# Patient Record
Sex: MALE
[De-identification: names, ages, dates, MRNs, and addresses within clinical notes are randomized per-mention and may not be internally consistent; named-entity substitution may affect disease eponyms.]

---

## 2020-02-06 NOTE — DIAGNOSTIC IMAGING REPORT
EXAMINATION:  CHEST 2 VIEWS    



INDICATION: Pre-operative



COMPARISON: None

     

FINDINGS:



LINES/TUBES:None



LUNGS:The lungs are well-inflated. No focal consolidation or pulmonary edema.



PLEURA:No pleural effusion or pneumothorax.



MEDIASTINUM:The cardiomediastinal silhouette appears normal in size and shape.



BONES/SOFT TISSUES:No acute osseous injury.



ABDOMEN:No free air under the diaphragm.





IMPRESSION: 

No focal pneumonia or pulmonary edema.



Signed by: Eriberto Jewell MD on 2/6/2020 3:01 PM

## 2020-02-10 ENCOUNTER — HOSPITAL ENCOUNTER (OUTPATIENT)
Dept: HOSPITAL 88 - OR | Age: 75
Discharge: HOME | End: 2020-02-10
Attending: SURGERY
Payer: MEDICARE

## 2020-02-10 VITALS — SYSTOLIC BLOOD PRESSURE: 116 MMHG | DIASTOLIC BLOOD PRESSURE: 68 MMHG

## 2020-02-10 DIAGNOSIS — Z94.0: ICD-10-CM

## 2020-02-10 DIAGNOSIS — Z91.048: ICD-10-CM

## 2020-02-10 DIAGNOSIS — I10: ICD-10-CM

## 2020-02-10 DIAGNOSIS — K43.6: Primary | ICD-10-CM

## 2020-02-10 DIAGNOSIS — Z88.8: ICD-10-CM

## 2020-02-10 PROCEDURE — 93005 ELECTROCARDIOGRAM TRACING: CPT

## 2020-02-10 PROCEDURE — 71046 X-RAY EXAM CHEST 2 VIEWS: CPT

## 2020-02-10 PROCEDURE — 49653: CPT

## 2020-02-10 NOTE — XMS REPORT
Patient Summary Document

                             Created on: 02/10/2020



ROSALBA COOPER

External Reference #: [id number]

: 1945

Sex: Male



Demographics







                          Address                   [address] [place] [address] 

[place]  [address]

 

                          Home Phone                [phone]

 

                          Preferred Language        Unknown

 

                          Marital Status            Unknown

 

                          Restoration Affiliation     Unknown

 

                          Race                      Unknown

 

                                        Additional Race(s) 

 

 

                          Ethnic Group              Unknown





Author







                          Author                    [facility]

 

                          [facility]

 

                          Address                   Unknown

 

                          Phone                     Unavailable







Care Team Providers







                    Care Team Member Name    Role                Phone

 

                    ANABEL TRINH    Unavailable         Unavailable

 

                    MACEYKRISHAN SUTTON    Unavailable         Unavailable







Problems

This patient has no known problems.



Allergies, Adverse Reactions, Alerts

This patient has no known allergies or adverse reactions.



Medications

This patient has no known medications.



Encounters







             Start Date/Time    End Date/Time    Encounter Type    Admission Type    Attending Clinicians

                    Care Facility       Care Department     Encounter ID

 

        2019 09:16:29            Outpatient                    [facility]    [id number]

 

        2019 06:45:00    2019 06:45:00    Outpatient                    [facility]    7511







Results







           Test Description    Test Time    Test Comments    Text Results    Atomic Results    Result

 Comments

 

                CHEST 2 VIEWS    2020 15:01:00                                                             

                                             [place] [address]  
   Patient Name: ROSALBA COOPER                                   MR #: 
[id number]                     : 1945                                  
Age/Sex: 74/M  Acct #: [id number]                              Req #: [id number]
[id number]  Adm Physician:                                                      
Ordered by: DEEPA TRINH MD                            Report #: [id number]
       Location: OR                                      Room/Bed:              
      
___________________________________________________________________________________________________
   Procedure: [id number] DX/CHEST 2 VIEWS  Exam Date: 20                   
        Exam Time: 1435                                              REPORT 
STATUS: Signed    EXAMINATION:  CHEST 2 VIEWS          INDICATION: Pre-operative
     COMPARISON: None           FINDINGS:      LINES/TUBES:None      LUNGS:The 
lungs are well-inflated. No focal consolidation or pulmonary edema.      
PLEURA:No pleural effusion or pneumothorax.      MEDIASTINUM:The 
cardiomediastinal silhouette appears normal in size and shape.      BONES/SOFT 
TISSUES:No acute osseous injury.      ABDOMEN:No free air under the diaphragm.  
      IMPRESSION:    No focal pneumonia or pulmonary edema.      Signed by: 
Genoveva Alvarado MD on 2020 3:01 PM        Dictated By: GENOVEVA ALVARADO MD  
Electronically Signed By: GENOVEVA ALAVRADO MD on 20 1501  Transcribed By: TRESA ELIAS on 20 1501       COPY TO:   DEEPA TRINH MD              

 

                TACROLIMUS LEVEL    2019 11:13:00                      

 

   

 

                TACROLIMUS BLOOD (BEAKER) (test code=657)    6.2 ng/mL       10.0-20.0        





CREATININE, RANDOM [id number] 09:49:00* 





                Test Item       Value           Reference Range    Comments

 

                CREATININE URINE (BEAKER) (test code=375)    98.2 mg/dL                       





Reference Range: No NormalsPROTEIN, RANDOM [id number] 09:49:00* 





                Test Item       Value           Reference Range    Comments

 

                PROTEIN, URINE (BEAKER) (test code=1569)    10 mg/dL        0-14             





URINALYSIS W/ REFLEX URINE [id number] 09:25:00* 





                Test Item       Value           Reference Range    Comments

 

                COLOR (BEAKER) (test code=470)    Yellow                           

 

                CLARITY (BEAKER) (test code=469)    Clear                            

 

                SPECIFIC GRAVITY UA (BEAKER) (test code=468)    1.015           1.001-1.035      

 

                PH UA (BEAKER) (test code=467)    6.0             5.0-8.0          

 

                PROTEIN UA (BEAKER) (test code=464)    Negative        Negative         

 

                GLUCOSE UA (BEAKER) (test code=365)    Negative        Negative         

 

                KETONES UA (BEAKER) (test code=371)    Negative        Negative         

 

                BILIRUBIN UA (BEAKER) (test code=462)    Negative        Negative         

 

                BLOOD UA (BEAKER) (test code=461)    Negative        Negative         

 

                NITRITE UA (BEAKER) (test code=465)    Negative        Negative         

 

                LEUKOCYTE ESTERASE UA (BEAKER) (test code=466)    Negative        Negative         

 

                UROBILINOGEN UA (BEAKER) (test code=463)    0.2 mg/dL       0.2-1.0          

 

                RBC UA (BEAKER) (test code=519)    1 /HPF                           

 

                WBC UA (BEAKER) (test code=520)    < /HPF                           

 

                SQUAMOUS EPITHELIAL (BEAKER) (test code=516)    < /HPF                           

 

                SOURCE(BEAKER) (test code=2795)                                     





COMPREHENSIVE METABOLIC [id number] 09:00:00* 





                Test Item       Value           Reference Range    Comments

 

                TOTAL PROTEIN (BEAKER) (test code=770)    6.8 gm/dL       6.0-8.3          

 

                ALBUMIN (BEAKER) (test code=1145)    4.2 g/dL        3.5-5.0          

 

                ALKALINE PHOSPHATASE (BEAKER) (test code=346)    87 U/L                     

 

                BILIRUBIN TOTAL (BEAKER) (test code=377)    0.3 mg/dL       0.2-1.2          

 

                SODIUM (BEAKER) (test code=381)    141 meq/L       136-145          

 

                POTASSIUM (BEAKER) (test code=379)    3.5 meq/L       3.5-5.1          

 

                CHLORIDE (BEAKER) (test code=382)    108 meq/L                  

 

                CO2 (BEAKER) (test code=355)    25 meq/L        22-29            

 

                BLOOD UREA NITROGEN (BEAKER) (test code=354)    18 mg/dL        7-21             

 

                CREATININE (BEAKER) (test code=358)    1.19 mg/dL      0.57-1.25        

 

                GLUCOSE RANDOM (BEAKER) (test code=652)    118 mg/dL                  

 

                CALCIUM (BEAKER) (test code=697)    8.9 mg/dL       8.4-10.2         

 

                AST (SGOT) (BEAKER) (test code=353)    19 U/L          5-34             

 

                ALT (SGPT) (BEAKER) (test code=347)    21 U/L          6-55             

 

                EGFR (BEAKER) (test code=1092)    60 mL/min/1.73 sq m                    ESTIMATED GFR IS NOT AS 

ACCURATE AS CREATININE CLEARANCE IN PREDICTING GLOMERULAR FILTRATION RATE. 
ESTIMATED GFR IS NOT APPLICABLE FOR DIALYSIS PATIENTS.





PTH, [id number] 08:54:00* 





                Test Item       Value           Reference Range    Comments

 

                PARATHYROID HORMONE INTACT (BEAKER) (test code=577)    95.2 pg/mL      8.5-72.5         





CBC W/PLT COUNT & AUTO [id number] 08:33:00* 





                Test Item       Value           Reference Range    Comments

 

                WHITE BLOOD CELL COUNT (BEAKER) (test code=775)    6.0 K/ L        3.5-10.5         

 

                RED BLOOD CELL COUNT (BEAKER) (test code=761)    4.36 M/ L       4.63-6.08        

 

                HEMOGLOBIN (BEAKER) (test code=410)    12.9 GM/DL      13.7-17.5        

 

                HEMATOCRIT (BEAKER) (test code=411)    40.1 %          40.1-51.0        

 

                MEAN CORPUSCULAR VOLUME (BEAKER) (test code=753)    92.0 fL         79.0-92.2        

 

                MEAN CORPUSCULAR HEMOGLOBIN (BEAKER) (test code=751)    29.6 pg         25.7-32.2        

 

                    MEAN CORPUSCULAR HEMOGLOBIN CONC (BEAKER) (test code=752)    32.2 GM/DL          32.3-36.5

                                         

 

                RED CELL DISTRIBUTION WIDTH (BEAKER) (test code=412)    14.6 %          11.6-14.4        

 

                PLATELET COUNT (BEAKER) (test code=756)    192 K/CU MM     150-450          

 

                MEAN PLATELET VOLUME (BEAKER) (test code=754)    10.1 fL         9.4-12.4         

 

                NUCLEATED RED BLOOD CELLS (BEAKER) (test code=413)    0 /100 WBC      0-0              

 

                NEUTROPHILS RELATIVE PERCENT (BEAKER) (test code=429)    63 %                             

 

                LYMPHOCYTES RELATIVE PERCENT (BEAKER) (test code=430)    23 %                             

 

                MONOCYTES RELATIVE PERCENT (BEAKER) (test code=431)    9 %                              

 

                EOSINOPHILS RELATIVE PERCENT (BEAKER) (test code=432)    4 %                              

 

                BASOPHILS RELATIVE PERCENT (BEAKER) (test code=437)    0 %                              

 

                NEUTROPHILS ABSOLUTE COUNT (BEAKER) (test code=670)    3.76 K/ L       1.78-5.38        

 

                LYMPHOCYTES ABSOLUTE COUNT (BEAKER) (test code=414)    1.38 K/ L       1.32-3.57        

 

                MONOCYTES ABSOLUTE COUNT (BEAKER) (test code=415)    0.55 K/ L       0.30-0.82        

 

                EOSINOPHILS ABSOLUTE COUNT (BEAKER) (test code=416)    0.24 K/ L       0.04-0.54        

 

                BASOPHILS ABSOLUTE COUNT (BEAKER) (test code=417)    0.02 K/ L       0.01-0.08        

 

                IMMATURE GRANULOCYTES-RELATIVE PERCENT (BEAKER) (test code=2801)    0 %             0-1              





TACROLIMUS LEVEL2019-06-10 13:25:00* 





                Test Item       Value           Reference Range    Comments

 

                TACROLIMUS BLOOD (BEAKER) (test code=657)    5.6 ng/mL       10.0-20.0        





URINALYSIS W/ REFLEX URINE CULTURE2019-06-10 11:33:00* 





                Test Item       Value           Reference Range    Comments

 

                COLOR (BEAKER) (test code=470)    Yellow                           

 

                CLARITY (BEAKER) (test code=469)    Clear                            

 

                SPECIFIC GRAVITY UA (BEAKER) (test code=468)    1.012           1.001-1.035      

 

                PH UA (BEAKER) (test code=467)    6.0             5.0-8.0          

 

                PROTEIN UA (BEAKER) (test code=464)    Negative        Negative         

 

                GLUCOSE UA (BEAKER) (test code=365)    Negative        Negative         

 

                KETONES UA (BEAKER) (test code=371)    Negative        Negative         

 

                BILIRUBIN UA (BEAKER) (test code=462)    Negative        Negative         

 

                BLOOD UA (BEAKER) (test code=461)    Negative        Negative         

 

                NITRITE UA (BEAKER) (test code=465)    Negative        Negative         

 

                LEUKOCYTE ESTERASE UA (BEAKER) (test code=466)    Negative        Negative         

 

                UROBILINOGEN UA (BEAKER) (test code=463)    0.2 mg/dL       0.2-1.0          

 

                RBC UA (BEAKER) (test code=519)    < /HPF                           

 

                WBC UA (BEAKER) (test code=520)    < /HPF                           

 

                SQUAMOUS EPITHELIAL (BEAKER) (test code=516)    < /HPF                           

 

                SOURCE(BEAKER) (test code=2795)                                     





CREATININE, RANDOM URINE2019-06-10 10:05:00* 





                Test Item       Value           Reference Range    Comments

 

                CREATININE URINE (BEAKER) (test code=375)    81.1 mg/dL                       





Reference Range: No NormalsPROTEIN, RANDOM URINE2019-06-10 10:05:00* 





                Test Item       Value           Reference Range    Comments

 

                PROTEIN, URINE (BEAKER) (test code=1569)    7 mg/dL         0-14             





COMPREHENSIVE METABOLIC PANEL2019-06-10 09:19:00* 





                Test Item       Value           Reference Range    Comments

 

                TOTAL PROTEIN (BEAKER) (test code=770)    7.2 gm/dL       6.0-8.3          

 

                ALBUMIN (BEAKER) (test code=1145)    4.4 g/dL        3.5-5.0          

 

                ALKALINE PHOSPHATASE (BEAKER) (test code=346)    71 U/L                     

 

                BILIRUBIN TOTAL (BEAKER) (test code=377)    0.6 mg/dL       0.2-1.2          

 

                SODIUM (BEAKER) (test code=381)    139 meq/L       136-145          

 

                POTASSIUM (BEAKER) (test code=379)    3.5 meq/L       3.5-5.1          

 

                CHLORIDE (BEAKER) (test code=382)    105 meq/L                  

 

                CO2 (BEAKER) (test code=355)    26 meq/L        22-29            

 

                BLOOD UREA NITROGEN (BEAKER) (test code=354)    18 mg/dL        7-21             

 

                CREATININE (BEAKER) (test code=358)    1.13 mg/dL      0.57-1.25        

 

                GLUCOSE RANDOM (BEAKER) (test code=652)    101 mg/dL                  

 

                CALCIUM (BEAKER) (test code=697)    9.1 mg/dL       8.4-10.2         

 

                AST (SGOT) (BEAKER) (test code=353)    16 U/L          5-34             

 

                ALT (SGPT) (BEAKER) (test code=347)    16 U/L          6-55             

 

                EGFR (BEAKER) (test code=1092)    64 mL/min/1.73 sq m                    ESTIMATED GFR IS NOT AS 

ACCURATE AS CREATININE CLEARANCE IN PREDICTING GLOMERULAR FILTRATION RATE. 
ESTIMATED GFR IS NOT APPLICABLE FOR DIALYSIS PATIENTS.





PTH, INTACT2019-06-10 09:04:00* 





                Test Item       Value           Reference Range    Comments

 

                PARATHYROID HORMONE INTACT (BEAKER) (test code=577)    82.0 pg/mL      8.5-72.5         





CBC W/PLT COUNT & AUTO DIFFERENTIAL2019-06-10 08:40:00* 





                Test Item       Value           Reference Range    Comments

 

                WHITE BLOOD CELL COUNT (BEAKER) (test code=775)    8.0 K/ L        3.5-10.5         

 

                RED BLOOD CELL COUNT (BEAKER) (test code=761)    4.48 M/ L       4.63-6.08        

 

                HEMOGLOBIN (BEAKER) (test code=410)    13.0 GM/DL      13.7-17.5        

 

                HEMATOCRIT (BEAKER) (test code=411)    41.7 %          40.1-51.0        

 

                MEAN CORPUSCULAR VOLUME (BEAKER) (test code=753)    93.1 fL         79.0-92.2        

 

                MEAN CORPUSCULAR HEMOGLOBIN (BEAKER) (test code=751)    29.0 pg         25.7-32.2        

 

                    MEAN CORPUSCULAR HEMOGLOBIN CONC (BEAKER) (test code=752)    31.2 GM/DL          32.3-36.5

                                         

 

                RED CELL DISTRIBUTION WIDTH (BEAKER) (test code=412)    14.6 %          11.6-14.4        

 

                PLATELET COUNT (BEAKER) (test code=756)    207 K/CU MM     150-450          

 

                MEAN PLATELET VOLUME (BEAKER) (test code=754)    10.0 fL         9.4-12.4         

 

                NUCLEATED RED BLOOD CELLS (BEAKER) (test code=413)    0 /100 WBC      0-0              

 

                NEUTROPHILS RELATIVE PERCENT (BEAKER) (test code=429)    65 %                             

 

                LYMPHOCYTES RELATIVE PERCENT (BEAKER) (test code=430)    21 %                             

 

                MONOCYTES RELATIVE PERCENT (BEAKER) (test code=431)    9 %                              

 

                EOSINOPHILS RELATIVE PERCENT (BEAKER) (test code=432)    5 %                              

 

                BASOPHILS RELATIVE PERCENT (BEAKER) (test code=437)    1 %                              

 

                NEUTROPHILS ABSOLUTE COUNT (BEAKER) (test code=670)    5.15 K/ L       1.78-5.38        

 

                LYMPHOCYTES ABSOLUTE COUNT (BEAKER) (test code=414)    1.64 K/ L       1.32-3.57        

 

                MONOCYTES ABSOLUTE COUNT (BEAKER) (test code=415)    0.74 K/ L       0.30-0.82        

 

                EOSINOPHILS ABSOLUTE COUNT (BEAKER) (test code=416)    0.38 K/ L       0.04-0.54        

 

                BASOPHILS ABSOLUTE COUNT (BEAKER) (test code=417)    0.04 K/ L       0.01-0.08        

 

                IMMATURE GRANULOCYTES-RELATIVE PERCENT (BEAKER) (test code=2801)    0 %             0-1              





TACROLIMUS [id number] 11:56:00* 





                Test Item       Value           Reference Range    Comments

 

                TACROLIMUS BLOOD (BEAKER) (test code=657)    7.0 ng/mL       10.0-20.0        





CREATININE, RANDOM [id number] 10:53:00* 





                Test Item       Value           Reference Range    Comments

 

                CREATININE URINE (BEAKER) (test code=375)    78.5 mg/dL                       





Reference Range: No NormalsPROTEIN, RANDOM [id number] 10:53:00* 





                Test Item       Value           Reference Range    Comments

 

                PROTEIN, URINE (BEAKER) (test code=1569)    10 mg/dL        0-14             





PTH, [id number] 10:27:00* 





                Test Item       Value           Reference Range    Comments

 

                PARATHYROID HORMONE INTACT (BEAKER) (test code=577)    76.9 pg/mL      8.5-72.5         





LIPID [id number] 10:20:00* 





                Test Item       Value           Reference Range    Comments

 

                TRIGLYCERIDES (BEAKER) (test code=540)    161 mg/dL                        

 

                CHOLESTEROL (BEAKER) (test code=631)    153 mg/dL                        

 

                HDL CHOLESTEROL (BEAKER) (test code=976)    43 mg/dL                         

 

                LDL CHOLESTEROL CALCULATED (BEAKER) (test code=633)    78 mg/dL                         





Triglyceride Reference Range:   Low Risk         <150   Borderline    150-199   
High Risk     200-499   Very High Risk  >=500Cholesterol Reference Range:   Low 
Risk         <200   Borderline    200-239    High Risk        >240HDL 
Cholesterol Reference Range:   Low Risk         >=60   High Risk         <40LDL 
Cholesterol Reference Range:   Optimal          <100   Near Optimal  100-129   
Borderline    130-159   High          160-189   Very High       >=190   
COMPREHENSIVE METABOLIC [id number] 10:20:00* 





                Test Item       Value           Reference Range    Comments

 

                TOTAL PROTEIN (BEAKER) (test code=770)    7.0 gm/dL       6.0-8.3          

 

                ALBUMIN (BEAKER) (test code=1145)    4.3 g/dL        3.5-5.0          

 

                ALKALINE PHOSPHATASE (BEAKER) (test code=346)    68 U/L                     

 

                BILIRUBIN TOTAL (BEAKER) (test code=377)    0.6 mg/dL       0.2-1.2          

 

                SODIUM (BEAKER) (test code=381)    140 meq/L       136-145          

 

                POTASSIUM (BEAKER) (test code=379)    3.6 meq/L       3.5-5.1          

 

                CHLORIDE (BEAKER) (test code=382)    107 meq/L                  

 

                CO2 (BEAKER) (test code=355)    25 meq/L        22-29            

 

                BLOOD UREA NITROGEN (BEAKER) (test code=354)    17 mg/dL        7-21             

 

                CREATININE (BEAKER) (test code=358)    1.17 mg/dL      0.57-1.25        

 

                GLUCOSE RANDOM (BEAKER) (test code=652)    109 mg/dL                  

 

                CALCIUM (BEAKER) (test code=697)    9.1 mg/dL       8.4-10.2         

 

                AST (SGOT) (BEAKER) (test code=353)    24 U/L          5-34             

 

                ALT (SGPT) (BEAKER) (test code=347)    18 U/L          6-55             

 

                EGFR (BEAKER) (test code=1092)    61 mL/min/1.73 sq m                    ESTIMATED GFR IS NOT AS 

ACCURATE AS CREATININE CLEARANCE IN PREDICTING GLOMERULAR FILTRATION RATE. 
ESTIMATED GFR IS NOT APPLICABLE FOR DIALYSIS PATIENTS.





CBC W/PLT COUNT & AUTO [id number] 09:54:00* 





                Test Item       Value           Reference Range    Comments

 

                WHITE BLOOD CELL COUNT (BEAKER) (test code=775)    5.8 K/ L        3.5-10.5         

 

                RED BLOOD CELL COUNT (BEAKER) (test code=761)    4.32 M/ L       4.63-6.08        

 

                HEMOGLOBIN (BEAKER) (test code=410)    12.7 GM/DL      13.7-17.5        

 

                HEMATOCRIT (BEAKER) (test code=411)    40.4 %          40.1-51.0        

 

                MEAN CORPUSCULAR VOLUME (BEAKER) (test code=753)    93.5 fL         79.0-92.2        

 

                MEAN CORPUSCULAR HEMOGLOBIN (BEAKER) (test code=751)    29.4 pg         25.7-32.2        

 

                    MEAN CORPUSCULAR HEMOGLOBIN CONC (BEAKER) (test code=752)    31.4 GM/DL          32.3-36.5

                                         

 

                RED CELL DISTRIBUTION WIDTH (BEAKER) (test code=412)    14.6 %          11.6-14.4        

 

                PLATELET COUNT (BEAKER) (test code=756)    188 K/CU MM     150-450          

 

                MEAN PLATELET VOLUME (BEAKER) (test code=754)    10.1 fL         9.4-12.4         

 

                NUCLEATED RED BLOOD CELLS (BEAKER) (test code=413)    0 /100 WBC      0-0              

 

                NEUTROPHILS RELATIVE PERCENT (BEAKER) (test code=429)    63 %                             

 

                LYMPHOCYTES RELATIVE PERCENT (BEAKER) (test code=430)    20 %                             

 

                MONOCYTES RELATIVE PERCENT (BEAKER) (test code=431)    10 %                             

 

                EOSINOPHILS RELATIVE PERCENT (BEAKER) (test code=432)    7 %                              

 

                BASOPHILS RELATIVE PERCENT (BEAKER) (test code=437)    1 %                              

 

                NEUTROPHILS ABSOLUTE COUNT (BEAKER) (test code=670)    3.67 K/ L       1.78-5.38        

 

                LYMPHOCYTES ABSOLUTE COUNT (BEAKER) (test code=414)    1.14 K/ L       1.32-3.57        

 

                MONOCYTES ABSOLUTE COUNT (BEAKER) (test code=415)    0.55 K/ L       0.30-0.82        

 

                EOSINOPHILS ABSOLUTE COUNT (BEAKER) (test code=416)    0.40 K/ L       0.04-0.54        

 

                BASOPHILS ABSOLUTE COUNT (BEAKER) (test code=417)    0.03 K/ L       0.01-0.08        

 

                IMMATURE GRANULOCYTES-RELATIVE PERCENT (BEAKER) (test code=2801)    0 %             0-1              





TACROLIMUS [id number] 11:44:00* 





                Test Item       Value           Reference Range    Comments

 

                TACROLIMUS BLOOD (BEAKER) (test code=657)    6.2 ng/mL       10.0-20.0        





PTH, [id number] 09:40:00* 





                Test Item       Value           Reference Range    Comments

 

                PARATHYROID HORMONE INTACT (BEAKER) (test code=577)    104.7 pg/mL     8.5-72.5         





CREATININE, RANDOM [id number] 09:40:00* 





                Test Item       Value           Reference Range    Comments

 

                CREATININE URINE (BEAKER) (test code=375)    96.6 mg/dL                       





Reference Range: No NormalsPROTEIN, RANDOM [id number] 09:40:00* 





                Test Item       Value           Reference Range    Comments

 

                PROTEIN, URINE (BEAKER) (test code=1569)    7 mg/dL         0-14             





COMPREHENSIVE METABOLIC [id number] 09:32:00* 





                Test Item       Value           Reference Range    Comments

 

                TOTAL PROTEIN (BEAKER) (test code=770)    6.8 gm/dL       6.0-8.3          

 

                ALBUMIN (BEAKER) (test code=1145)    4.3 g/dL        3.5-5.0          

 

                ALKALINE PHOSPHATASE (BEAKER) (test code=346)    63 U/L                     

 

                BILIRUBIN TOTAL (BEAKER) (test code=377)    0.6 mg/dL       0.2-1.2          

 

                SODIUM (BEAKER) (test code=381)    141 meq/L       136-145          

 

                POTASSIUM (BEAKER) (test code=379)    3.7 meq/L       3.5-5.1          

 

                CHLORIDE (BEAKER) (test code=382)    107 meq/L                  

 

                CO2 (BEAKER) (test code=355)    25 meq/L        22-29            

 

                BLOOD UREA NITROGEN (BEAKER) (test code=354)    17 mg/dL        7-21             

 

                CREATININE (BEAKER) (test code=358)    0.99 mg/dL      0.57-1.25        

 

                GLUCOSE RANDOM (BEAKER) (test code=652)    107 mg/dL                  

 

                CALCIUM (BEAKER) (test code=697)    9.1 mg/dL       8.4-10.2         

 

                AST (SGOT) (BEAKER) (test code=353)    20 U/L          5-34             

 

                ALT (SGPT) (BEAKER) (test code=347)    20 U/L          6-55             

 

                EGFR (BEAKER) (test code=1092)    74 mL/min/1.73 sq m                    ESTIMATED GFR IS NOT AS 

ACCURATE AS CREATININE CLEARANCE IN PREDICTING GLOMERULAR FILTRATION RATE. 
ESTIMATED GFR IS NOT APPLICABLE FOR DIALYSIS PATIENTS.





CBC W/PLT COUNT & AUTO [id number] 09:12:00* 





                Test Item       Value           Reference Range    Comments

 

                WHITE BLOOD CELL COUNT (BEAKER) (test code=775)    5.2 K/ L        3.5-10.5         

 

                RED BLOOD CELL COUNT (BEAKER) (test code=761)    4.42 M/ L       4.63-6.08        

 

                HEMOGLOBIN (BEAKER) (test code=410)    12.8 GM/DL      13.7-17.5        

 

                HEMATOCRIT (BEAKER) (test code=411)    40.7 %          40.1-51.0        

 

                MEAN CORPUSCULAR VOLUME (BEAKER) (test code=753)    92.1 fL         79.0-92.2        

 

                MEAN CORPUSCULAR HEMOGLOBIN (BEAKER) (test code=751)    29.0 pg         25.7-32.2        

 

                    MEAN CORPUSCULAR HEMOGLOBIN CONC (BEAKER) (test code=752)    31.4 GM/DL          32.3-36.5

                                         

 

                RED CELL DISTRIBUTION WIDTH (BEAKER) (test code=412)    14.6 %          11.6-14.4        

 

                PLATELET COUNT (BEAKER) (test code=756)    194 K/CU MM     150-450          

 

                MEAN PLATELET VOLUME (BEAKER) (test code=754)    10.3 fL         9.4-12.4         

 

                NUCLEATED RED BLOOD CELLS (BEAKER) (test code=413)    0 /100 WBC      0-0              

 

                NEUTROPHILS RELATIVE PERCENT (BEAKER) (test code=429)    59 %                             

 

                LYMPHOCYTES RELATIVE PERCENT (BEAKER) (test code=430)    25 %                             

 

                MONOCYTES RELATIVE PERCENT (BEAKER) (test code=431)    11 %                             

 

                EOSINOPHILS RELATIVE PERCENT (BEAKER) (test code=432)    5 %                              

 

                BASOPHILS RELATIVE PERCENT (BEAKER) (test code=437)    1 %                              

 

                NEUTROPHILS ABSOLUTE COUNT (BEAKER) (test code=670)    3.03 K/ L       1.78-5.38        

 

                LYMPHOCYTES ABSOLUTE COUNT (BEAKER) (test code=414)    1.28 K/ L       1.32-3.57        

 

                MONOCYTES ABSOLUTE COUNT (BEAKER) (test code=415)    0.54 K/ L       0.30-0.82        

 

                EOSINOPHILS ABSOLUTE COUNT (BEAKER) (test code=416)    0.26 K/ L       0.04-0.54        

 

                BASOPHILS ABSOLUTE COUNT (BEAKER) (test code=417)    0.03 K/ L       0.01-0.08        

 

                IMMATURE GRANULOCYTES-RELATIVE PERCENT (BEAKER) (test code=2801)    0 %             0-1              





URINALYSIS W/ REFLEX URINE [id number] 09:11:00* 





                Test Item       Value           Reference Range    Comments

 

                COLOR (BEAKER) (test code=470)    Yellow                           

 

                CLARITY (BEAKER) (test code=469)    Clear                            

 

                SPECIFIC GRAVITY UA (BEAKER) (test code=468)    1.013           1.001-1.035      

 

                PH UA (BEAKER) (test code=467)    6.0             5.0-8.0          

 

                PROTEIN UA (BEAKER) (test code=464)    Negative        Negative         

 

                GLUCOSE UA (BEAKER) (test code=365)    Negative        Negative         

 

                KETONES UA (BEAKER) (test code=371)    Negative        Negative         

 

                BILIRUBIN UA (BEAKER) (test code=462)    Negative        Negative         

 

                BLOOD UA (BEAKER) (test code=461)    Negative        Negative         

 

                NITRITE UA (BEAKER) (test code=465)    Negative        Negative         

 

                LEUKOCYTE ESTERASE UA (BEAKER) (test code=466)    Negative        Negative         

 

                UROBILINOGEN UA (BEAKER) (test code=463)    0.2 mg/dL       0.2-1.0          

 

                RBC UA (BEAKER) (test code=519)    1 /HPF                           

 

                WBC UA (BEAKER) (test code=520)    1 /HPF                           

 

                SOURCE(BEAKER) (test code=2795)                                     





TACROLIMUS LEVEL2017-11-15 12:43:00* 





                Test Item       Value           Reference Range    Comments

 

                TACROLIMUS BLOOD (BEAKER) (test code=657)    6.9 ng/mL       10.0-20.0        





URINALYSIS W/ REFLEX URINE CULTURE2017-11-15 11:29:00* 





                Test Item       Value           Reference Range    Comments

 

                COLOR (BEAKER) (test code=470)    Light Yellow                     

 

                CLARITY (BEAKER) (test code=469)    Clear                            

 

                SPECIFIC GRAVITY UA (BEAKER) (test code=468)    1.012           1.001-1.035      

 

                PH UA (BEAKER) (test code=467)    6.0             5.0-8.0          

 

                PROTEIN UA (BEAKER) (test code=464)    Negative        Negative         

 

                GLUCOSE UA (BEAKER) (test code=365)    Negative        Negative         

 

                KETONES UA (BEAKER) (test code=371)    Negative        Negative         

 

                BILIRUBIN UA (BEAKER) (test code=462)    Negative        Negative         

 

                BLOOD UA (BEAKER) (test code=461)    Negative        Negative         

 

                NITRITE UA (BEAKER) (test code=465)    Negative        Negative         

 

                LEUKOCYTE ESTERASE UA (BEAKER) (test code=466)    Negative        Negative         

 

                UROBILINOGEN UA (BEAKER) (test code=463)    0.2 mg/dL       0.2-1.0          

 

                RBC UA (BEAKER) (test code=519)    1 /HPF                           

 

                WBC UA (BEAKER) (test code=520)    < /HPF                           

 

                SOURCE(BEAKER) (test code=2795)                                     





CREATININE, RANDOM URINE2017-11-15 09:44:00* 





                Test Item       Value           Reference Range    Comments

 

                CREATININE URINE (BEAKER) (test code=375)    63.9 mg/dL                       





Reference Range: No NormalsPROTEIN, RANDOM URINE2017-11-15 09:44:00* 





                Test Item       Value           Reference Range    Comments

 

                PROTEIN, URINE (BEAKER) (test code=1569)    8 mg/dL         0-14             





COMPREHENSIVE METABOLIC PANEL2017-11-15 09:42:00* 





                Test Item       Value           Reference Range    Comments

 

                TOTAL PROTEIN (BEAKER) (test code=770)    7.1 gm/dL       6.0-8.3          

 

                ALBUMIN (BEAKER) (test code=1145)    4.2 g/dL        3.5-5.0          

 

                ALKALINE PHOSPHATASE (BEAKER) (test code=346)    65 U/L                     

 

                BILIRUBIN TOTAL (BEAKER) (test code=377)    0.5 mg/dL       0.2-1.2          

 

                SODIUM (BEAKER) (test code=381)    140 meq/L       136-145          

 

                POTASSIUM (BEAKER) (test code=379)    3.7 meq/L       3.5-5.1          

 

                CHLORIDE (BEAKER) (test code=382)    107 meq/L                  

 

                CO2 (BEAKER) (test code=355)    24 meq/L        22-29            

 

                BLOOD UREA NITROGEN (BEAKER) (test code=354)    21 mg/dL        7-21             

 

                CREATININE (BEAKER) (test code=358)    0.98 mg/dL      0.57-1.25        

 

                GLUCOSE RANDOM (BEAKER) (test code=652)    102 mg/dL                  

 

                CALCIUM (BEAKER) (test code=697)    9.1 mg/dL       8.4-10.2         

 

                AST (SGOT) (BEAKER) (test code=353)    22 U/L          5-34             

 

                ALT (SGPT) (BEAKER) (test code=347)    19 U/L          6-55             

 

                EGFR (BEAKER) (test code=1092)    75 mL/min/1.73 sq m                    ESTIMATED GFR IS NOT AS 

ACCURATE AS CREATININE CLEARANCE IN PREDICTING GLOMERULAR FILTRATION RATE. 
ESTIMATED GFR IS NOT APPLICABLE FOR DIALYSIS PATIENTS.





PTH, INTACT2017-11-15 09:40:00* 





                Test Item       Value           Reference Range    Comments

 

                PARATHYROID HORMONE INTACT (BEAKER) (test code=577)    74.8 pg/mL      8.5-72.5         





CBC W/PLT COUNT & AUTO DIFFERENTIAL2017-11-15 09:24:00* 





                Test Item       Value           Reference Range    Comments

 

                WHITE BLOOD CELL COUNT (BEAKER) (test code=775)    6.3 K/ L        3.5-10.5         

 

                RED BLOOD CELL COUNT (BEAKER) (test code=761)    4.45 M/ L       4.63-6.08        

 

                HEMOGLOBIN (BEAKER) (test code=410)    13.3 GM/DL      13.7-17.5        

 

                HEMATOCRIT (BEAKER) (test code=411)    41.3 %          40.1-51.0        

 

                MEAN CORPUSCULAR VOLUME (BEAKER) (test code=753)    92.8 fL         79.0-92.2        

 

                MEAN CORPUSCULAR HEMOGLOBIN (BEAKER) (test code=751)    29.9 pg         25.7-32.2        

 

                    MEAN CORPUSCULAR HEMOGLOBIN CONC (BEAKER) (test code=752)    32.2 GM/DL          32.3-36.5

                                         

 

                RED CELL DISTRIBUTION WIDTH (BEAKER) (test code=412)    14.4 %          11.6-14.4        

 

                PLATELET COUNT (BEAKER) (test code=756)    209 K/CU MM     150-450          

 

                MEAN PLATELET VOLUME (BEAKER) (test code=754)    10.2 fL         9.4-12.4         

 

                NUCLEATED RED BLOOD CELLS (BEAKER) (test code=413)    0 /100 WBC      0-0              

 

                NEUTROPHILS RELATIVE PERCENT (BEAKER) (test code=429)    64 %                             

 

                LYMPHOCYTES RELATIVE PERCENT (BEAKER) (test code=430)    21 %                             

 

                MONOCYTES RELATIVE PERCENT (BEAKER) (test code=431)    9 %                              

 

                EOSINOPHILS RELATIVE PERCENT (BEAKER) (test code=432)    5 %                              

 

                BASOPHILS RELATIVE PERCENT (BEAKER) (test code=437)    1 %                              

 

                NEUTROPHILS ABSOLUTE COUNT (BEAKER) (test code=670)    4.05 K/ L       1.78-5.38        

 

                LYMPHOCYTES ABSOLUTE COUNT (BEAKER) (test code=414)    1.30 K/ L       1.32-3.57        

 

                MONOCYTES ABSOLUTE COUNT (BEAKER) (test code=415)    0.59 K/ L       0.30-0.82        

 

                EOSINOPHILS ABSOLUTE COUNT (BEAKER) (test code=416)    0.30 K/ L       0.04-0.54        

 

                BASOPHILS ABSOLUTE COUNT (BEAKER) (test code=417)    0.03 K/ L       0.01-0.08        

 

                IMMATURE GRANULOCYTES-RELATIVE PERCENT (BEAKER) (test code=2801)    0 %             0-1              





TACROLIMUS [id number] 12:51:00* 





                Test Item       Value           Reference Range    Comments

 

                TACROLIMUS BLOOD (BEAKER) (test code=657)    5.7 ng/mL       10.0-20.0        





LIPID [id number] 10:05:00* 





                Test Item       Value           Reference Range    Comments

 

                TRIGLYCERIDES (BEAKER) (test code=540)    447 mg/dL                        

 

                CHOLESTEROL (BEAKER) (test code=631)    155 mg/dL                        

 

                HDL CHOLESTEROL (BEAKER) (test code=976)    42 mg/dL                         





Calculated LDL not valid if triglyceride >400 mg/dLTriglyceride Reference Range:
  Low Risk         <150   Borderline    150-199   High Risk     200-499   Very 
High Risk  >=500Cholesterol Reference Range:   Low Risk         <200   
Borderline    200-239    High Risk        >240HDL Cholesterol Reference Range:  
Low Risk         >=60   High Risk         <40LDL Cholesterol Reference Range:   
Optimal          <100   Near Optimal  100-129   Borderline    130-159   High    
     160-189   Very High       >=190   Specimen markedly lipemicCOMPREHENSIVE 
METABOLIC [id number] 09:37:00* 





                Test Item       Value           Reference Range    Comments

 

                TOTAL PROTEIN (BEAKER) (test code=770)    7.5 gm/dL       6.0-8.3          

 

                ALBUMIN (BEAKER) (test code=1145)    4.2 g/dL        3.5-5.0          

 

                ALKALINE PHOSPHATASE (BEAKER) (test code=346)    78 U/L                     

 

                BILIRUBIN TOTAL (BEAKER) (test code=377)    0.4 mg/dL       0.2-1.2          

 

                SODIUM (BEAKER) (test code=381)    140 meq/L       136-145          

 

                POTASSIUM (BEAKER) (test code=379)    4.2 meq/L       3.5-5.1          

 

                CHLORIDE (BEAKER) (test code=382)    106 meq/L                  

 

                CO2 (BEAKER) (test code=355)    24 meq/L        22-29            

 

                BLOOD UREA NITROGEN (BEAKER) (test code=354)    16 mg/dL        7-21             

 

                CREATININE (BEAKER) (test code=358)    1.17 mg/dL      0.57-1.25        

 

                GLUCOSE RANDOM (BEAKER) (test code=652)    96 mg/dL                   

 

                CALCIUM (BEAKER) (test code=697)    8.8 mg/dL       8.4-10.2         

 

                AST (SGOT) (BEAKER) (test code=353)    20 U/L          5-34             

 

                ALT (SGPT) (BEAKER) (test code=347)    16 U/L          6-55             

 

                EGFR (BEAKER) (test code=1092)    61 mL/min/1.73 sq m                    ESTIMATED GFR IS NOT AS 

ACCURATE AS CREATININE CLEARANCE IN PREDICTING GLOMERULAR FILTRATION RATE. 
ESTIMATED GFR IS NOT APPLICABLE FOR DIALYSIS PATIENTS.





Specimen markedly lipemicURINALYSIS W/ REFLEX URINE [id number] 09:36:00* 





                Test Item       Value           Reference Range    Comments

 

                COLOR (BEAKER) (test code=470)    Yellow                           

 

                CLARITY (BEAKER) (test code=469)    Clear                            

 

                SPECIFIC GRAVITY UA (BEAKER) (test code=468)    1.015           1.001-1.035      

 

                PH UA (BEAKER) (test code=467)    6.0             5.0-8.0          

 

                PROTEIN UA (BEAKER) (test code=464)    Negative        Negative         

 

                GLUCOSE UA (BEAKER) (test code=365)    Negative        Negative         

 

                KETONES UA (BEAKER) (test code=371)    Negative        Negative         

 

                BILIRUBIN UA (BEAKER) (test code=462)    Negative        Negative         

 

                BLOOD UA (BEAKER) (test code=461)    Negative        Negative         

 

                NITRITE UA (BEAKER) (test code=465)    Negative        Negative         

 

                LEUKOCYTE ESTERASE UA (BEAKER) (test code=466)    Negative        Negative         

 

                UROBILINOGEN UA (BEAKER) (test code=463)    0.2 mg/dL       0.2-1.0          

 

                RBC UA (BEAKER) (test code=519)    < /HPF                           

 

                WBC UA (BEAKER) (test code=520)    7 /HPF                           

 

                SOURCE(BEAKER) (test code=2795)                                     





PTH, [id number] 09:33:00* 





                Test Item       Value           Reference Range    Comments

 

                PARATHYROID HORMONE INTACT (BEAKER) (test code=577)    138.2 pg/mL     8.5-72.5         





Effective 2014: Reference Range ChangeNew: 8.5-72.5   Previous: 15.0-90.0
CREATININE, RANDOM [id number] 09:30:00* 





                Test Item       Value           Reference Range    Comments

 

                CREATININE URINE (BEAKER) (test code=375)    129.3 mg/dL                      





Reference Range: No NormalsPROTEIN, RANDOM [id number] 09:30:00* 





                Test Item       Value           Reference Range    Comments

 

                PROTEIN, URINE (BEAKER) (test code=1569)    9 mg/dL         0-14             





CBC W/PLT COUNT & AUTO [id number] 09:12:00* 





                Test Item       Value           Reference Range    Comments

 

                WHITE BLOOD CELL COUNT (BEAKER) (test code=775)    7.6 K/ L        4.0-10.0         

 

                RED BLOOD CELL COUNT (BEAKER) (test code=761)    4.12 M/ L       4.20-5.80        

 

                HEMOGLOBIN (BEAKER) (test code=410)    13.1 GM/DL      13.0-16.8        

 

                HEMATOCRIT (BEAKER) (test code=411)    39.0 %          40.0-50.0        

 

                MEAN CORPUSCULAR VOLUME (BEAKER) (test code=753)    94.5 fL         82.0-98.0        

 

                MEAN CORPUSCULAR HEMOGLOBIN (BEAKER) (test code=751)    31.9 pg         27.0-33.0        

 

                    MEAN CORPUSCULAR HEMOGLOBIN CONC (BEAKER) (test code=752)    33.7 GM/DL          32.0-36.0

                                         

 

                RED CELL DISTRIBUTION WIDTH (BEAKER) (test code=412)    14.4 %          10.3-14.2        

 

                PLATELET COUNT (BEAKER) (test code=756)    177 K/CU MM     150-430          

 

                MEAN PLATELET VOLUME (BEAKER) (test code=754)    7.5 fL          6.5-10.5         

 

                NUCLEATED RED BLOOD CELLS (BEAKER) (test code=413)    0 /100 WBC      0-0              

 

                NEUTROPHILS RELATIVE PERCENT (BEAKER) (test code=429)    65 %                             

 

                LYMPHOCYTES RELATIVE PERCENT (BEAKER) (test code=430)    17 %                             

 

                MONOCYTES RELATIVE PERCENT (BEAKER) (test code=431)    9 %                              

 

                EOSINOPHILS RELATIVE PERCENT (BEAKER) (test code=432)    8 %                              

 

                BASOPHILS RELATIVE PERCENT (BEAKER) (test code=437)    1 %                              

 

                NEUTROPHILS ABSOLUTE COUNT (BEAKER) (test code=670)    4.93 K/ L       1.80-8.00        

 

                LYMPHOCYTES ABSOLUTE COUNT (BEAKER) (test code=414)    1.28 K/ L       1.48-4.50        

 

                MONOCYTES ABSOLUTE COUNT (BEAKER) (test code=415)    0.69 K/ L       0.00-1.30        

 

                EOSINOPHILS ABSOLUTE COUNT (BEAKER) (test code=416)    0.63 K/ L       0.00-0.50        

 

                BASOPHILS ABSOLUTE COUNT (BEAKER) (test code=417)    0.05 K/ L       0.00-0.20        





0.00TACROLIMUS [id number] 11:20:00* 





                Test Item       Value           Reference Range    Comments

 

                TACROLIMUS BLOOD (BEAKER) (test code=657)    5.8 ng/mL       10.0-20.0        





PROTEIN, RANDOM [id number] 09:36:00* 





                Test Item       Value           Reference Range    Comments

 

                PROTEIN, URINE (BEAKER) (test code=1569)    < mg/dL         0-14             





CREATININE, RANDOM [id number] 09:31:00* 





                Test Item       Value           Reference Range    Comments

 

                CREATININE URINE (BEAKER) (test code=375)    83.5 mg/dL                       





Reference Range: No NormalsPTH, [id number] 09:28:00* 





                Test Item       Value           Reference Range    Comments

 

                PARATHYROID HORMONE INTACT (BEAKER) (test code=577)    85.4 pg/mL      8.5-72.5         





Effective 2014: Reference Range ChangeNew: 8.5-72.5   Previous: 15.0-90.0
COMPREHENSIVE METABOLIC [id number] 09:25:00* 





                Test Item       Value           Reference Range    Comments

 

                TOTAL PROTEIN (BEAKER) (test code=770)    7.3 gm/dL       6.0-8.3         Specimen slightly hemolyzed



 

                ALBUMIN (BEAKER) (test code=1145)    4.2 g/dL        3.5-5.0         Specimen slightly hemolyzed



 

                ALKALINE PHOSPHATASE (BEAKER) (test code=346)    75 U/L                     

 

                BILIRUBIN TOTAL (BEAKER) (test code=377)    0.4 mg/dL       0.2-1.2         Specimen slightly 

hemolyzed

 

                SODIUM (BEAKER) (test code=381)    140 meq/L       136-145          

 

                POTASSIUM (BEAKER) (test code=379)    3.7 meq/L       3.5-5.1         Specimen slightly hemolyzed



 

                CHLORIDE (BEAKER) (test code=382)    108 meq/L                  

 

                CO2 (BEAKER) (test code=355)    22 meq/L        22-29            

 

                BLOOD UREA NITROGEN (BEAKER) (test code=354)    21 mg/dL        7-21             

 

                CREATININE (BEAKER) (test code=358)    1.08 mg/dL      0.57-1.25       Specimen slightly hemolyzed



 

                GLUCOSE RANDOM (BEAKER) (test code=652)    102 mg/dL                  

 

                CALCIUM (BEAKER) (test code=697)    8.9 mg/dL       8.4-10.2         

 

                AST (SGOT) (BEAKER) (test code=353)    26 U/L          5-34            Specimen slightly hemolyzed

 

                ALT (SGPT) (BEAKER) (test code=347)    18 U/L          6-55            Specimen slightly hemolyzed

 

                EGFR (BEAKER) (test code=1092)    67 mL/min/1.73 sq m                    ESTIMATED GFR IS NOT AS 

ACCURATE AS CREATININE CLEARANCE IN PREDICTING GLOMERULAR FILTRATION RATE. 
ESTIMATED GFR IS NOT APPLICABLE FOR DIALYSIS PATIENTS.





URINALYSIS W/ REFLEX URINE [id number] 09:03:00* 





                Test Item       Value           Reference Range    Comments

 

                COLOR (BEAKER) (test code=470)    Yellow                           

 

                CLARITY (BEAKER) (test code=469)    Clear                            

 

                SPECIFIC GRAVITY UA (BEAKER) (test code=468)    1.014           1.001-1.035      

 

                PH UA (BEAKER) (test code=467)    5.5             5.0-8.0          

 

                PROTEIN UA (BEAKER) (test code=464)    Negative        Negative         

 

                GLUCOSE UA (BEAKER) (test code=365)    Negative        Negative         

 

                KETONES UA (BEAKER) (test code=371)    Negative        Negative         

 

                BILIRUBIN UA (BEAKER) (test code=462)    Negative        Negative         

 

                BLOOD UA (BEAKER) (test code=461)    Negative        Negative         

 

                NITRITE UA (BEAKER) (test code=465)    Negative        Negative         

 

                LEUKOCYTE ESTERASE UA (BEAKER) (test code=466)    Negative        Negative         

 

                UROBILINOGEN UA (BEAKER) (test code=463)    0.2 mg/dL       0.2-1.0          

 

                RBC UA (BEAKER) (test code=519)    1 /HPF                           

 

                WBC UA (BEAKER) (test code=520)    1 /HPF                           

 

                SOURCE(BEAKER) (test code=2795)                                     





CBC W/PLT COUNT & AUTO [id number] 09:00:00* 





                Test Item       Value           Reference Range    Comments

 

                WHITE BLOOD CELL COUNT (BEAKER) (test code=775)    7.5 K/ L        4.0-10.0         

 

                RED BLOOD CELL COUNT (BEAKER) (test code=761)    4.15 M/ L       4.20-5.80        

 

                HEMOGLOBIN (BEAKER) (test code=410)    12.7 GM/DL      13.0-16.8        

 

                HEMATOCRIT (BEAKER) (test code=411)    39.0 %          40.0-50.0        

 

                MEAN CORPUSCULAR VOLUME (BEAKER) (test code=753)    94.1 fL         82.0-98.0        

 

                MEAN CORPUSCULAR HEMOGLOBIN (BEAKER) (test code=751)    30.7 pg         27.0-33.0        

 

                    MEAN CORPUSCULAR HEMOGLOBIN CONC (BEAKER) (test code=752)    32.6 GM/DL          32.0-36.0

                                         

 

                RED CELL DISTRIBUTION WIDTH (BEAKER) (test code=412)    13.2 %          10.3-14.2        

 

                PLATELET COUNT (BEAKER) (test code=756)    182 K/CU MM     150-430          

 

                MEAN PLATELET VOLUME (BEAKER) (test code=754)    7.3 fL          6.5-10.5         

 

                NUCLEATED RED BLOOD CELLS (BEAKER) (test code=413)    0 /100 WBC      0-0              

 

                NEUTROPHILS RELATIVE PERCENT (BEAKER) (test code=429)    68 %                             

 

                LYMPHOCYTES RELATIVE PERCENT (BEAKER) (test code=430)    19 %                             

 

                MONOCYTES RELATIVE PERCENT (BEAKER) (test code=431)    9 %                              

 

                EOSINOPHILS RELATIVE PERCENT (BEAKER) (test code=432)    4 %                              

 

                BASOPHILS RELATIVE PERCENT (BEAKER) (test code=437)    0 %                              

 

                NEUTROPHILS ABSOLUTE COUNT (BEAKER) (test code=670)    5.10 K/ L       1.80-8.00        

 

                LYMPHOCYTES ABSOLUTE COUNT (BEAKER) (test code=414)    1.42 K/ L       1.48-4.50        

 

                MONOCYTES ABSOLUTE COUNT (BEAKER) (test code=415)    0.66 K/ L       0.00-1.30        

 

                EOSINOPHILS ABSOLUTE COUNT (BEAKER) (test code=416)    0.29 K/ L       0.00-0.50        

 

                BASOPHILS ABSOLUTE COUNT (BEAKER) (test code=417)    0.03 K/ L       0.00-0.20        





0.00